# Patient Record
Sex: FEMALE | Race: OTHER | NOT HISPANIC OR LATINO | URBAN - METROPOLITAN AREA
[De-identification: names, ages, dates, MRNs, and addresses within clinical notes are randomized per-mention and may not be internally consistent; named-entity substitution may affect disease eponyms.]

---

## 2019-09-24 ENCOUNTER — EMERGENCY (EMERGENCY)
Facility: HOSPITAL | Age: 34
LOS: 0 days | Discharge: HOME | End: 2019-09-25
Attending: EMERGENCY MEDICINE | Admitting: EMERGENCY MEDICINE
Payer: COMMERCIAL

## 2019-09-24 VITALS
OXYGEN SATURATION: 100 % | WEIGHT: 216.05 LBS | HEART RATE: 80 BPM | SYSTOLIC BLOOD PRESSURE: 147 MMHG | TEMPERATURE: 96 F | RESPIRATION RATE: 18 BRPM | DIASTOLIC BLOOD PRESSURE: 100 MMHG | HEIGHT: 67 IN

## 2019-09-24 DIAGNOSIS — R07.89 OTHER CHEST PAIN: ICD-10-CM

## 2019-09-24 DIAGNOSIS — R20.2 PARESTHESIA OF SKIN: ICD-10-CM

## 2019-09-24 DIAGNOSIS — E03.9 HYPOTHYROIDISM, UNSPECIFIED: ICD-10-CM

## 2019-09-24 DIAGNOSIS — R07.9 CHEST PAIN, UNSPECIFIED: ICD-10-CM

## 2019-09-24 DIAGNOSIS — G43.909 MIGRAINE, UNSPECIFIED, NOT INTRACTABLE, WITHOUT STATUS MIGRAINOSUS: ICD-10-CM

## 2019-09-24 DIAGNOSIS — Z90.49 ACQUIRED ABSENCE OF OTHER SPECIFIED PARTS OF DIGESTIVE TRACT: ICD-10-CM

## 2019-09-24 LAB
ALBUMIN SERPL ELPH-MCNC: 4.8 G/DL — SIGNIFICANT CHANGE UP (ref 3.5–5.2)
ALP SERPL-CCNC: 76 U/L — SIGNIFICANT CHANGE UP (ref 30–115)
ALT FLD-CCNC: 22 U/L — SIGNIFICANT CHANGE UP (ref 0–41)
ANION GAP SERPL CALC-SCNC: 14 MMOL/L — SIGNIFICANT CHANGE UP (ref 7–14)
AST SERPL-CCNC: 20 U/L — SIGNIFICANT CHANGE UP (ref 0–41)
BASOPHILS # BLD AUTO: 0.04 K/UL — SIGNIFICANT CHANGE UP (ref 0–0.2)
BASOPHILS NFR BLD AUTO: 0.4 % — SIGNIFICANT CHANGE UP (ref 0–1)
BILIRUB SERPL-MCNC: 0.7 MG/DL — SIGNIFICANT CHANGE UP (ref 0.2–1.2)
BUN SERPL-MCNC: 15 MG/DL — SIGNIFICANT CHANGE UP (ref 10–20)
CALCIUM SERPL-MCNC: 9.6 MG/DL — SIGNIFICANT CHANGE UP (ref 8.5–10.1)
CHLORIDE SERPL-SCNC: 105 MMOL/L — SIGNIFICANT CHANGE UP (ref 98–110)
CO2 SERPL-SCNC: 22 MMOL/L — SIGNIFICANT CHANGE UP (ref 17–32)
CREAT SERPL-MCNC: 1 MG/DL — SIGNIFICANT CHANGE UP (ref 0.7–1.5)
EOSINOPHIL # BLD AUTO: 0.09 K/UL — SIGNIFICANT CHANGE UP (ref 0–0.7)
EOSINOPHIL NFR BLD AUTO: 1 % — SIGNIFICANT CHANGE UP (ref 0–8)
GLUCOSE SERPL-MCNC: 97 MG/DL — SIGNIFICANT CHANGE UP (ref 70–99)
HCT VFR BLD CALC: 44.5 % — SIGNIFICANT CHANGE UP (ref 37–47)
HGB BLD-MCNC: 14.6 G/DL — SIGNIFICANT CHANGE UP (ref 12–16)
IMM GRANULOCYTES NFR BLD AUTO: 0.3 % — SIGNIFICANT CHANGE UP (ref 0.1–0.3)
LYMPHOCYTES # BLD AUTO: 3.88 K/UL — HIGH (ref 1.2–3.4)
LYMPHOCYTES # BLD AUTO: 42.6 % — SIGNIFICANT CHANGE UP (ref 20.5–51.1)
MAGNESIUM SERPL-MCNC: 2.2 MG/DL — SIGNIFICANT CHANGE UP (ref 1.8–2.4)
MCHC RBC-ENTMCNC: 29.4 PG — SIGNIFICANT CHANGE UP (ref 27–31)
MCHC RBC-ENTMCNC: 32.8 G/DL — SIGNIFICANT CHANGE UP (ref 32–37)
MCV RBC AUTO: 89.5 FL — SIGNIFICANT CHANGE UP (ref 81–99)
MONOCYTES # BLD AUTO: 0.96 K/UL — HIGH (ref 0.1–0.6)
MONOCYTES NFR BLD AUTO: 10.5 % — HIGH (ref 1.7–9.3)
NEUTROPHILS # BLD AUTO: 4.11 K/UL — SIGNIFICANT CHANGE UP (ref 1.4–6.5)
NEUTROPHILS NFR BLD AUTO: 45.2 % — SIGNIFICANT CHANGE UP (ref 42.2–75.2)
NRBC # BLD: 0 /100 WBCS — SIGNIFICANT CHANGE UP (ref 0–0)
NT-PROBNP SERPL-SCNC: 76 PG/ML — SIGNIFICANT CHANGE UP (ref 0–300)
PLATELET # BLD AUTO: 330 K/UL — SIGNIFICANT CHANGE UP (ref 130–400)
POTASSIUM SERPL-MCNC: 3.9 MMOL/L — SIGNIFICANT CHANGE UP (ref 3.5–5)
POTASSIUM SERPL-SCNC: 3.9 MMOL/L — SIGNIFICANT CHANGE UP (ref 3.5–5)
PROT SERPL-MCNC: 8 G/DL — SIGNIFICANT CHANGE UP (ref 6–8)
RBC # BLD: 4.97 M/UL — SIGNIFICANT CHANGE UP (ref 4.2–5.4)
RBC # FLD: 13.2 % — SIGNIFICANT CHANGE UP (ref 11.5–14.5)
SODIUM SERPL-SCNC: 141 MMOL/L — SIGNIFICANT CHANGE UP (ref 135–146)
TROPONIN T SERPL-MCNC: <0.01 NG/ML — SIGNIFICANT CHANGE UP
TROPONIN T SERPL-MCNC: <0.01 NG/ML — SIGNIFICANT CHANGE UP
WBC # BLD: 9.11 K/UL — SIGNIFICANT CHANGE UP (ref 4.8–10.8)
WBC # FLD AUTO: 9.11 K/UL — SIGNIFICANT CHANGE UP (ref 4.8–10.8)

## 2019-09-24 PROCEDURE — 71045 X-RAY EXAM CHEST 1 VIEW: CPT | Mod: 26

## 2019-09-24 PROCEDURE — 99218: CPT

## 2019-09-24 PROCEDURE — 70450 CT HEAD/BRAIN W/O DYE: CPT | Mod: 26

## 2019-09-24 PROCEDURE — 93010 ELECTROCARDIOGRAM REPORT: CPT | Mod: 76

## 2019-09-24 NOTE — CONSULT NOTE ADULT - SUBJECTIVE AND OBJECTIVE BOX
LISA ESCALANTE    Chief Complaint: Right facial and head numbness    HPI:  34 year old right handed woman with a past medical history of right facial parasthesias presents to the ED with chest discomfort and right V1 facial parasthesias extending to the right temporal parietal area for 30 minutes prior to admission. She denies migraine or aura. CTH failed to reveal acute intracranial pathology. Patients parasthesias improving, we discussed IV TPA with her and knowing the risks and benefits she agreed not to take the medication because of the rapidly improving minor symptoms.      Relevant PMH:  [] Prior ischemic stroke/TIA  [] Afib  []CAD  []HTN  []DLD  []DM []PVD []Obesity [] Sedintary lifestyle []CHF  []RAPHAEL []Cancer Hx     Social History: [] Smoking []  Drug Use: []   Alcohol Use:   [] Other:      Possible Location of Stroke:  Unknown at this time, will have a better understanding post stroke workup.  Possible Cause of Stroke:  Unknown at this time, will have a better understanding post stroke workup.  Relevant Cerebral Imaging:  < from: CT Head No Cont (09.24.19 @ 18:12) >  IMPRESSION:    No evidence of acute intracranial pathology.    Relevant Cervicocerebral Imaging:  MRA pending        Relevant blood tests:  pending  Relevant cardiac rhythm monitoring:  pending  Relevant Cardiac Structure:(TTE/MADHAV +/-):[]No intracardiac thrombus/[] no vegetation/[]no [place]/EF:  pending    Home Medications:      MEDICATIONS  (STANDING):      PT/OT/Speech/Rehab/S&Swr:pending    Exam:    Vital Signs Last 24 Hrs  T(C): 35.7 (24 Sep 2019 17:43), Max: 35.7 (24 Sep 2019 17:43)  T(F): 96.3 (24 Sep 2019 17:43), Max: 96.3 (24 Sep 2019 17:43)  HR: 80 (24 Sep 2019 17:43) (80 - 80)  BP: 147/100 (24 Sep 2019 17:43) (147/100 - 147/100)  BP(mean): --  RR: 18 (24 Sep 2019 17:43) (18 - 18)  SpO2: 100% (24 Sep 2019 17:43) (100% - 100%)    NIHSS      LOC:       1a:  0   1b(Questions):    0       1c(Instructions):     0        Best Gaze:0  Visual:0  Motor:                 RUE: 0    RLE:   0  LUE:    0 LLE: 0    FACE:   0  Limb Ataxia:0  Sensory:     1  Language:     0  Dysarthria:      0    Extinction and Inattention:0    NIHSS on admission:  1             m-RS:1    Impression:  34 year old right handed woman with a past medical history of right facial parasthesias presents to the ED with chest discomfort and right V1 facial parasthesias extending to the right temporal parietal area for 30 minutes prior to admission. CTH failed to reveal acute intracranial pathology. No IV thrombolytics given. Etiology of symptoms unknown at this time will have a better understanding post stroke workup.     Suggestion:  Routine stroke workup including:  MRI brain without gianluca.  MRA head and neck.   TTE.  LDL, A1C  Telemetry monitoring.   PT OT Rehab    Would start ASA and statin.  Cardiac workup for chest discomfort.    Disposition:  ED observation if symptoms resolve, stroke unit if persistent.    Praveen Crews NP  [id number]

## 2019-09-24 NOTE — ED CDU PROVIDER INITIAL DAY NOTE - OBJECTIVE STATEMENT
35y/o female with pmh of hypothyroid, on OCPs, cholecystectomy, pt. states she has been having cp for 3 weeks. pt. went to her pmd today who advised her to come to er. as pt. was driving to er she developed ha, right face and right arm tingling which started at 5:15pm today. denies nausea, vomiting, fever, chills, cough, sob, focal weakness, slurred speech. pt. is not a smoker

## 2019-09-24 NOTE — ED CDU PROVIDER INITIAL DAY NOTE - MEDICAL DECISION MAKING DETAILS
35yo F with PMHx hypothyroid, on OCPs, cholecystectomy, presents with chest pain for 3 weeks and paresthesias en route to ED. NIHSS 0. CTH negative. Troponin negative x1. CXR WNL. EKG nonischemic. In EDOU for TIA and ACS workups.

## 2019-09-24 NOTE — ED ADULT TRIAGE NOTE - CHIEF COMPLAINT QUOTE
"im having mid sternal chest pain for the past 2-3 weeks. when I was driving here I started feeling tingling on the right side of my face and down my right arm" "im having mid sternal chest pain for the past 2-3 weeks. when I was driving here I started feeling tingling on the right side of my face and down my right arm" acute change in sensation in triage

## 2019-09-24 NOTE — ED CDU PROVIDER INITIAL DAY NOTE - NEURO NEGATIVE STATEMENT, MLM
no loss of consciousness, no gait abnormality, + headache, + tingling to right face and right arm, no weakness.

## 2019-09-24 NOTE — STROKE CODE NOTE - ABSOLUTE EXCLUSION OTHER CRITERIA
Rapidly improving mild right V1 and right temporal parietal parasthesias, not a candidate for IV thrombolytics. Patient agreed with management.

## 2019-09-24 NOTE — ED PROVIDER NOTE - CLINICAL SUMMARY MEDICAL DECISION MAKING FREE TEXT BOX
I personally evaluated the patient. I reviewed the Resident’s or Physician Assistant’s note (as assigned above), and agree with the findings and plan except as documented in my note. NIH 0, patient ahs no focal neurological symptoms. patient place dine bela for further cardiac eval. neurology remains on board

## 2019-09-24 NOTE — ED ADULT NURSE NOTE - OBJECTIVE STATEMENT
"im having mid sternal chest pain for the past 2-3 weeks. when I was driving here I started feeling tingling on the right side of my face and down my right arm" acute change in sensation in triage

## 2019-09-24 NOTE — CONSULT NOTE ADULT - ATTENDING COMMENTS
Patient seen and examined and agree with above except as noted.  patient was having chest pain in doctors office.  Pain was in midsternal region and she had pins and needles sensation in her right face.  She took mylanta but pain didn't improve and because of the tingling she went to the ED.  Symptoms resolved after 30-45 minutes  Exam currently normal  NIHSS 0    Plan as above (If MRI negative then no further neuro work up in hospital and should follow for her migraines with a neurologist; no need to continue asa and statin unless medical need)

## 2019-09-24 NOTE — ED PROVIDER NOTE - PHYSICAL EXAMINATION
VITAL SIGNS: I have reviewed nursing notes and confirm.  CONSTITUTIONAL: well-appearing, non-toxic, NAD  SKIN: Warm dry, normal skin turgor  HEAD: NCAT  EYES: EOMI, PERRLA, no scleral icterus  ENT: Moist mucous membranes, normal pharynx with no erythema or exudates  NECK: Supple; non tender. Full ROM. No cervical LAD  CARD: RRR, no murmurs, rubs or gallops  RESP: clear to ausculation b/l.  No rales, rhonchi, or wheezing.  ABD: soft, + BS, non-tender, non-distended, no rebound or guarding. No CVA tenderness  EXT: Full ROM, no bony tenderness, no pedal edema, no calf tenderness  NEURO: normal motor. normal sensory. CN II-XII intact. Cerebellar testing normal. Normal gait.  PSYCH: Cooperative, appropriate.

## 2019-09-24 NOTE — ED PROVIDER NOTE - ATTENDING CONTRIBUTION TO CARE
34 year old female, no sig pmhx, come sin with complaint of midsternal chest pain, dull, non radiating, comes and goes, radiates to right arm and right face, no hemoptysis, no loc, no n/v/d. Stroke code activated at triage for right face paresthesias.     CONSTITUTIONAL: Well-developed; well-nourished; in no acute distress. Sitting up and providing appropriate history and physical examination  SKIN: skin exam is warm and dry, no acute rash.  HEAD: Normocephalic; atraumatic.  EYES: PERRL, 3 mm bilateral, no nystagmus, EOM intact; conjunctiva and sclera clear.  ENT: No nasal discharge; airway clear.  NECK: Supple; non tender. + full passive ROM in all directions. No JVD  CARD: S1, S2 normal; no murmurs, gallops, or rubs. Regular rate and rhythm. + Symmetric Strong Pulses  RESP: No wheezes, rales or rhonchi. Good air movement bilaterally  ABD: soft; non-distended; non-tender. No Rebound, No Guarding, No signs of peritonitis, No CVA tenderness. No pulsatile abdominal mass. + Strong and Symmetric Pulses  EXT: Normal ROM. No clubbing, cyanosis or edema. Dp and Pt Pulses intact. Cap refill less than 3 seconds  NEURO: CN 2-12 intact, normal finger to nose, normal romberg, stable gait, no sensory or motor deficits, Alert, oriented, grossly unremarkable. No Focal deficits. GCS 15. NIH 0  PSYCH: Cooperative, appropriate.   '

## 2019-09-24 NOTE — ED CDU PROVIDER INITIAL DAY NOTE - NS_OBSORDERDATE_ED_A_ED
I have reviewed and confirmed nurses' notes for patient's medications, allergies, medical history, and surgical history. 24-Sep-2019 20:06

## 2019-09-24 NOTE — ED PROVIDER NOTE - NS ED ROS FT
Constitutional: See HPI.  Eyes: No visual changes, eye pain or discharge. No Photophobia  ENMT: No hearing changes, pain, discharge or infections. No neck pain or stiffness. No limited ROM  Cardiac: see hpi  Respiratory: No cough or respiratory distress. No hemoptysis. No history of asthma or RAD.  GI: No nausea, vomiting, diarrhea or abdominal pain.  : No dysuria, frequency or burning. No Discharge  MS: No myalgia, muscle weakness, joint pain or back pain.  Neuro: see hpi  Skin: No skin rash.  Except as documented in the HPI, all other systems are negative.

## 2019-09-24 NOTE — ED ADULT NURSE REASSESSMENT NOTE - NSIMPLEMENTINTERV_GEN_ALL_ED
Implemented All Universal Safety Interventions:  [place] to call system. Call bell, personal items and telephone within reach. Instruct patient to call for assistance. Room bathroom lighting operational. Non-slip footwear when patient is off stretcher. Physically safe environment: no spills, clutter or unnecessary equipment. Stretcher in lowest position, wheels locked, appropriate side rails in place.

## 2019-09-24 NOTE — ED PROVIDER NOTE - PROGRESS NOTE DETAILS
patient had chest pain and right sided facial and arm tingling sensation. stroke code was activated by triage. NIH score - 0. place in obs for TIA and CP work up

## 2019-09-24 NOTE — ED PROVIDER NOTE - OBJECTIVE STATEMENT
34 year old p/w chest discomfort and right f acial parasthesias extending to the right temporal parietal area for 30 minutes She denies migraine or aura. patient states hx of facial paresthesias in the past, chest pain non-radiating, non;pleuritic, non exertional w/o assoc shortness of breath, n/v, sweats.

## 2019-09-24 NOTE — ED ADULT NURSE REASSESSMENT NOTE - COMFORT CARE
darkened lights/meal provided/ambulated to bathroom/warm blanket provided/po fluids offered/plan of care explained

## 2019-09-25 VITALS
RESPIRATION RATE: 18 BRPM | SYSTOLIC BLOOD PRESSURE: 124 MMHG | OXYGEN SATURATION: 99 % | TEMPERATURE: 98 F | HEART RATE: 70 BPM | DIASTOLIC BLOOD PRESSURE: 78 MMHG

## 2019-09-25 PROCEDURE — 70549 MR ANGIOGRAPH NECK W/O&W/DYE: CPT | Mod: 26

## 2019-09-25 PROCEDURE — 70551 MRI BRAIN STEM W/O DYE: CPT | Mod: 26

## 2019-09-25 PROCEDURE — 99217: CPT

## 2019-09-25 PROCEDURE — 75574 CT ANGIO HRT W/3D IMAGE: CPT | Mod: 26

## 2019-09-25 PROCEDURE — 93306 TTE W/DOPPLER COMPLETE: CPT | Mod: 26

## 2019-09-25 PROCEDURE — 99283 EMERGENCY DEPT VISIT LOW MDM: CPT

## 2019-09-25 PROCEDURE — 70544 MR ANGIOGRAPHY HEAD W/O DYE: CPT | Mod: 26,59

## 2019-09-25 RX ORDER — METOPROLOL TARTRATE 50 MG
50 TABLET ORAL ONCE
Refills: 0 | Status: COMPLETED | OUTPATIENT
Start: 2019-09-25 | End: 2019-09-25

## 2019-09-25 RX ADMIN — Medication 50 MILLIGRAM(S): at 10:57

## 2019-09-25 NOTE — ED CDU PROVIDER DISPOSITION NOTE - CARE PROVIDERS DIRECT ADDRESSES
[email or web address]

## 2019-09-25 NOTE — ED CDU PROVIDER DISPOSITION NOTE - CARE PROVIDER_API CALL
Corby Ashley)  EEGEpilepsy; Neurology  [address], [address]  [place] [address]  Phone: [phone]  Fax: [phone]  Follow Up Time:     Iván Moya)  Cardiovascular Disease; Internal Medicine; Interventional Cardiology  [address]  [place] [address]  Phone: [phone]  Fax: [phone]  Follow Up Time:

## 2019-09-25 NOTE — ED CDU PROVIDER SUBSEQUENT DAY NOTE - PROGRESS NOTE DETAILS
pt. is resting comfortably. non focal neuro exam. trops negative. neuro team is requesting mri and echo. will continue to reassess. pt is seen bedside, NAD, not complaining of any neuro symptoms, getting tia work up pending MRI/A will follow results and continue to monitor patient. pt also scheduled for CCTA, negative cardiac enzymes x2 and nl ekg.

## 2019-09-25 NOTE — ED CDU PROVIDER SUBSEQUENT DAY NOTE - MEDICAL DECISION MAKING DETAILS
33yo F p/w chest pain for 3 weeks and paresthesias en route to ED. NIHSS 0. CTH negative. Troponin negative x2. CXR WNL. EKG nonischemic. MRI/MRA H/N negative. CCTA negative. Will f/u with cardiology and neuro.

## 2019-09-25 NOTE — ED ADULT NURSE REASSESSMENT NOTE - NS ED NURSE REASSESS COMMENT FT1
pt aaox3 and transported to ED3, ambulatory and v/s stable, report given to Kathleen HAWKINS
Patient currently sleeping comfortably in hospital bed, call bell in reach. Cardiac monitored hr 51 sinus bradycardia while sleeping. No abnormal cardiac events overnight. No complaints of pain or discomfort. Safety precautions maintained.
Patient transferred to observation unit. AOX4, steady ambulation. Patient explained she was brought in for right facial and right arm numbness with on sudden chest pain. Patient denies any numbness, tingling, chest pain, shortness of breath, nausea, vomiting, diarrhea, confusion, weakness, headache. Patient educated on plan of care scheduled MRI and echo in the morning. Patient verbalized an understanding. IV patent and intact. Oriented to unit, call bell in reach, warm blanket and fluids provided, safety precautions maintained. IV patent and intact. HR 77 on cardiac monitor in normal sinus rhythm.

## 2019-09-25 NOTE — ED CDU PROVIDER DISPOSITION NOTE - PATIENT PORTAL LINK FT
You can access the FollowMyHealth Patient Portal offered by [facility] by registering at the following website: [email or web address]. By joining [employer]’s FollowMyHealth portal, you will also be able to view your health information using other applications (apps) compatible with our system.

## 2019-09-25 NOTE — ED CDU PROVIDER DISPOSITION NOTE - CLINICAL COURSE
33yo F p/w chest pain for 3 weeks and paresthesias en route to ED. NIHSS 0. CTH negative. Troponin negative x2. CXR WNL. EKG nonischemic. MRI/MRA H/N negative. CCTA negative. Case d/w neurology. Will f/u with cardiology and neuro.

## 2019-09-25 NOTE — ED CDU PROVIDER SUBSEQUENT DAY NOTE - ATTENDING CONTRIBUTION TO CARE
I personally evaluated the patient. I reviewed the Resident’s or Physician Assistant’s note (as assigned above), and agree with the findings and plan except as documented in my note.    35yo F with PMHx hypothyroid, cholecystectomy, on OCPs, nonsmoker, migraines, p/w chest pain for 3 weeks, sharp, midsternal, nonexertional, nonpleuritic, nonradiating. Saw PMD who advised pt to go to ED for eval. On drive here began feeling anxious, hyperventilating, then had right face and arm tingling/paresthesias assoc with mild right temporal headache. Denies fever, chills, dizziness, lightheadedness, SOB, cough, nausea, vomiting, abd pain, leg swelling.     No overnight events. Currently asymptomatic.     Vital signs reviewed  GENERAL: Patient nontoxic appearing, NAD  HEAD: NCAT  EYES: Anicteric. PERRL, EOMI  ENT: MMM  RESPIRATORY: Normal respiratory effort. CTA B/L. No wheezing, rales, rhonchi  CARDIOVASCULAR: Regular rate and rhythm  ABDOMEN: Soft. Nondistended. Nontender. No guarding or rebound  MUSCULOSKELETAL/EXTREMITIES: Brisk cap refill. Equal radial pulses. No leg edema  SKIN:  Warm and dry  NEURO: AAOx3. GCS 15. Speech clear and coherent. Answering questions appropriately. Face symmetric, no facial droop. Strength 5/5 x4, no drift. Normal finger-to-nose. No dysmetria. Ambulating normally, no gait abnormality. No gross FND. NIHSS = 0

## 2023-04-28 PROBLEM — E03.9 HYPOTHYROIDISM, UNSPECIFIED: Chronic | Status: ACTIVE | Noted: 2019-09-24

## 2023-05-04 ENCOUNTER — OUTPATIENT (OUTPATIENT)
Dept: OUTPATIENT SERVICES | Facility: HOSPITAL | Age: 38
LOS: 1 days | End: 2023-05-04
Payer: COMMERCIAL

## 2023-05-04 DIAGNOSIS — Z12.31 ENCOUNTER FOR SCREENING MAMMOGRAM FOR MALIGNANT NEOPLASM OF BREAST: ICD-10-CM

## 2023-05-04 PROCEDURE — 77063 BREAST TOMOSYNTHESIS BI: CPT | Mod: 26

## 2023-05-04 PROCEDURE — 77067 SCR MAMMO BI INCL CAD: CPT | Mod: 26

## 2023-05-04 PROCEDURE — 77063 BREAST TOMOSYNTHESIS BI: CPT

## 2023-05-04 PROCEDURE — 77067 SCR MAMMO BI INCL CAD: CPT

## 2023-05-05 DIAGNOSIS — Z12.31 ENCOUNTER FOR SCREENING MAMMOGRAM FOR MALIGNANT NEOPLASM OF BREAST: ICD-10-CM

## 2023-10-03 PROBLEM — Z00.00 ENCOUNTER FOR PREVENTIVE HEALTH EXAMINATION: Status: ACTIVE | Noted: 2023-10-03

## 2023-10-04 ENCOUNTER — APPOINTMENT (OUTPATIENT)
Dept: ORTHOPEDIC SURGERY | Facility: CLINIC | Age: 38
End: 2023-10-04

## 2024-10-01 ENCOUNTER — OUTPATIENT (OUTPATIENT)
Dept: OUTPATIENT SERVICES | Facility: HOSPITAL | Age: 39
LOS: 1 days | End: 2024-10-01
Payer: COMMERCIAL

## 2024-10-01 DIAGNOSIS — Z12.31 ENCOUNTER FOR SCREENING MAMMOGRAM FOR MALIGNANT NEOPLASM OF BREAST: ICD-10-CM

## 2024-10-01 PROCEDURE — 77063 BREAST TOMOSYNTHESIS BI: CPT

## 2024-10-01 PROCEDURE — 77067 SCR MAMMO BI INCL CAD: CPT

## 2024-10-01 PROCEDURE — 77063 BREAST TOMOSYNTHESIS BI: CPT | Mod: 26

## 2024-10-01 PROCEDURE — 77067 SCR MAMMO BI INCL CAD: CPT | Mod: 26

## 2024-10-02 DIAGNOSIS — Z12.31 ENCOUNTER FOR SCREENING MAMMOGRAM FOR MALIGNANT NEOPLASM OF BREAST: ICD-10-CM
